# Patient Record
Sex: MALE | Race: WHITE | NOT HISPANIC OR LATINO | Employment: FULL TIME | URBAN - METROPOLITAN AREA
[De-identification: names, ages, dates, MRNs, and addresses within clinical notes are randomized per-mention and may not be internally consistent; named-entity substitution may affect disease eponyms.]

---

## 2017-04-06 ENCOUNTER — TELEPHONE (OUTPATIENT)
Dept: NURSING | Facility: CLINIC | Age: 57
End: 2017-04-06

## 2017-04-06 NOTE — TELEPHONE ENCOUNTER
Call Type: Triage Call    Presenting Problem: Abdirahman was seen yesterday, diagnosed with  influenza. Prescribed Tamiflu. Fevers have been 102.8-103.2 today.  Has taken Advil, last taken about 0900 (9 hours ago).  Triage Note:  Guideline Title: Fever - Adult  Recommended Disposition: Provide Home/Self Care  Original Inclination: Wanted to speak with a nurse  Override Disposition:  Intended Action: Follow Selfcare / Homecare  Physician Contacted: No  Taking a new prescription for antibiotic less than 48 hours AND has new or  worsening fever or recurring fever. ?  YES  Signs of dehydration ? NO  Abdominal pain is present ? NO  Sudden change in mental status ? NO  New seizure now or within last 6 hours ? NO  Temperature of 101.5 F (38.6 C) or greater that has not responded to 24 hours of  home care measures ? NO  Severe breathing problems not related to nasal congestion ? NO  Has urgency, frequency, discolored urine, pain or burning with urination ? NO  Has urgency, frequency, discolored urine, pain or burning with urination ? NO  Fever after starting new medication within past 7 days ? NO  Has received immunization in past 48 hours ? NO  New skin rash associated with the fever ? NO  Recurrence of fever 101.5 F (38.6 C) in individual after more than 24 continuous  hours without any fever ? NO  Fever associated with heat exposure ? NO  Associated with cold or upper respiratory infection symptoms ? NO  Sudden onset of flu-like symptoms ? NO  Fever 104 F (40.0 C) or higher ? NO  Fever occurring intermittently on most days for more than 1 week ? NO  Less than 4 weeks postoperative or post other invasive procedure ? NO  Has completed antibiotic therapy within last 7 days OR is taking new prescription  of antibiotic for 48 hours or more AND has new or worsening fever, recurring  fever, or other new symptoms ? NO  Worsening signs of soft tissue infection ? NO  Any temperature elevation AND new onset of neck pain with forward  head movement  (no injury), severe generalized headache, or altered mental status ? NO  Frail elderly or immunocompromised with an oral temperature greater than 99 F  (37.2) or higher ? NO  Pregnant person with temperature of 100.5 F (38.0 C) or higher ? NO  Recurrence of fever 99 F (37.2 C) or greater in frail elderly after more than 24  continuous hours without any fever ? NO  Recurrence of fever 100 F (37.7 C) or greater in immunocompromised or pregnant  person after more than 24 continuous hours without any fever ? NO  High to low (but not zero) risk of exposure to Ebola within the past 21 days ? NO  Traveled out of country in past 2 months and new onset of unexplained symptom(s) ?  NO  Unexplained blood-colored (purple or red) flat pinpoint dots, spots or patches on  the skin ? NO  Physician Instructions:  Care Advice: During pregnancy, call provider if temperature is 100 F (37.7  C) or greater OR any temperature elevation for 3 days even while taking  acetaminophen.  Follow provider's specified instructions. If instructions not given or are  not able to be followed for whatever reason, call provider.  COMFORT MEASURES FOR A FEVER:   - Drink cool liquids or eat ice chips or  popsicles. Avoid drinks with alcohol or caffeine.   - Wear one layer of  light-weight clothing.   - Consider using a fan to improve circulation.   -  Rest until temperature returns to normal and other symptoms improve.   -  Use a lightweight blanket or other bedding.   - A lukewarm (not cold) bath  or shower can help lower body temperature.  Cold water can cause shivering  and raise temperature. If shivering starts, dry off and cover with  lightweight clothing.

## 2018-11-23 ENCOUNTER — APPOINTMENT (OUTPATIENT)
Dept: CT IMAGING | Facility: CLINIC | Age: 58
End: 2018-11-23
Attending: EMERGENCY MEDICINE
Payer: COMMERCIAL

## 2018-11-23 ENCOUNTER — HOSPITAL ENCOUNTER (EMERGENCY)
Facility: CLINIC | Age: 58
Discharge: HOME | End: 2018-11-23
Attending: EMERGENCY MEDICINE | Admitting: EMERGENCY MEDICINE
Payer: COMMERCIAL

## 2018-11-23 ENCOUNTER — APPOINTMENT (OUTPATIENT)
Dept: ULTRASOUND IMAGING | Facility: CLINIC | Age: 58
End: 2018-11-23
Attending: EMERGENCY MEDICINE
Payer: COMMERCIAL

## 2018-11-23 VITALS
TEMPERATURE: 98 F | HEART RATE: 91 BPM | BODY MASS INDEX: 26.29 KG/M2 | SYSTOLIC BLOOD PRESSURE: 127 MMHG | DIASTOLIC BLOOD PRESSURE: 86 MMHG | WEIGHT: 178 LBS | RESPIRATION RATE: 18 BRPM | OXYGEN SATURATION: 95 %

## 2018-11-23 DIAGNOSIS — I82.812 ACUTE SUPERFICIAL VENOUS THROMBOSIS OF LOWER EXTREMITY, LEFT: ICD-10-CM

## 2018-11-23 LAB
ANION GAP SERPL CALCULATED.3IONS-SCNC: 5 MMOL/L (ref 3–14)
BASOPHILS # BLD AUTO: 0.1 10E9/L (ref 0–0.2)
BASOPHILS NFR BLD AUTO: 1.1 %
BUN SERPL-MCNC: 18 MG/DL (ref 7–30)
CALCIUM SERPL-MCNC: 8.1 MG/DL (ref 8.5–10.1)
CHLORIDE SERPL-SCNC: 110 MMOL/L (ref 94–109)
CO2 SERPL-SCNC: 29 MMOL/L (ref 20–32)
CREAT SERPL-MCNC: 0.9 MG/DL (ref 0.66–1.25)
DIFFERENTIAL METHOD BLD: NORMAL
EOSINOPHIL # BLD AUTO: 0.1 10E9/L (ref 0–0.7)
EOSINOPHIL NFR BLD AUTO: 1.8 %
ERYTHROCYTE [DISTWIDTH] IN BLOOD BY AUTOMATED COUNT: 12.6 % (ref 10–15)
GFR SERPL CREATININE-BSD FRML MDRD: 87 ML/MIN/1.7M2
GLUCOSE SERPL-MCNC: 93 MG/DL (ref 70–99)
HCT VFR BLD AUTO: 43.1 % (ref 40–53)
HGB BLD-MCNC: 14.3 G/DL (ref 13.3–17.7)
IMM GRANULOCYTES # BLD: 0 10E9/L (ref 0–0.4)
IMM GRANULOCYTES NFR BLD: 0.2 %
INTERPRETATION ECG - MUSE: NORMAL
LYMPHOCYTES # BLD AUTO: 2.8 10E9/L (ref 0.8–5.3)
LYMPHOCYTES NFR BLD AUTO: 41.4 %
MCH RBC QN AUTO: 31.2 PG (ref 26.5–33)
MCHC RBC AUTO-ENTMCNC: 33.2 G/DL (ref 31.5–36.5)
MCV RBC AUTO: 94 FL (ref 78–100)
MONOCYTES # BLD AUTO: 0.8 10E9/L (ref 0–1.3)
MONOCYTES NFR BLD AUTO: 11.4 %
NEUTROPHILS # BLD AUTO: 2.9 10E9/L (ref 1.6–8.3)
NEUTROPHILS NFR BLD AUTO: 44.1 %
NRBC # BLD AUTO: 0 10*3/UL
NRBC BLD AUTO-RTO: 0 /100
NT-PROBNP SERPL-MCNC: 190 PG/ML (ref 0–900)
PLATELET # BLD AUTO: 243 10E9/L (ref 150–450)
POTASSIUM SERPL-SCNC: 3.9 MMOL/L (ref 3.4–5.3)
RBC # BLD AUTO: 4.58 10E12/L (ref 4.4–5.9)
SODIUM SERPL-SCNC: 144 MMOL/L (ref 133–144)
TROPONIN I SERPL-MCNC: <0.015 UG/L (ref 0–0.04)
WBC # BLD AUTO: 6.6 10E9/L (ref 4–11)

## 2018-11-23 PROCEDURE — 99285 EMERGENCY DEPT VISIT HI MDM: CPT | Mod: 25

## 2018-11-23 PROCEDURE — 71260 CT THORAX DX C+: CPT

## 2018-11-23 PROCEDURE — 80048 BASIC METABOLIC PNL TOTAL CA: CPT | Performed by: EMERGENCY MEDICINE

## 2018-11-23 PROCEDURE — 84484 ASSAY OF TROPONIN QUANT: CPT | Performed by: EMERGENCY MEDICINE

## 2018-11-23 PROCEDURE — 25000128 H RX IP 250 OP 636: Performed by: EMERGENCY MEDICINE

## 2018-11-23 PROCEDURE — 96360 HYDRATION IV INFUSION INIT: CPT | Mod: 59

## 2018-11-23 PROCEDURE — 83880 ASSAY OF NATRIURETIC PEPTIDE: CPT | Performed by: EMERGENCY MEDICINE

## 2018-11-23 PROCEDURE — 93971 EXTREMITY STUDY: CPT | Mod: LT

## 2018-11-23 PROCEDURE — 96361 HYDRATE IV INFUSION ADD-ON: CPT

## 2018-11-23 PROCEDURE — 85025 COMPLETE CBC W/AUTO DIFF WBC: CPT | Performed by: EMERGENCY MEDICINE

## 2018-11-23 RX ORDER — LIDOCAINE 40 MG/G
CREAM TOPICAL
Status: DISCONTINUED | OUTPATIENT
Start: 2018-11-23 | End: 2018-11-23 | Stop reason: HOSPADM

## 2018-11-23 RX ORDER — IOPAMIDOL 755 MG/ML
500 INJECTION, SOLUTION INTRAVASCULAR ONCE
Status: COMPLETED | OUTPATIENT
Start: 2018-11-23 | End: 2018-11-23

## 2018-11-23 RX ADMIN — SODIUM CHLORIDE 82 ML: 9 INJECTION, SOLUTION INTRAVENOUS at 04:49

## 2018-11-23 RX ADMIN — SODIUM CHLORIDE 1000 ML: 9 INJECTION, SOLUTION INTRAVENOUS at 04:17

## 2018-11-23 RX ADMIN — IOPAMIDOL 67 ML: 755 INJECTION, SOLUTION INTRAVENOUS at 04:40

## 2018-11-23 ASSESSMENT — ENCOUNTER SYMPTOMS
MYALGIAS: 1
SHORTNESS OF BREATH: 1

## 2018-11-23 NOTE — ED AVS SNAPSHOT
[facility] Emergency Department    [address] [place] [address]    Phone:  [phone]    Fax:  [phone]                                       Abdirahman Reno   MRN: [id number]    Department:  [facility] Emergency Department   Date of Visit:  11/23/2018           Patient Information     Date Of Birth          1960        Your diagnoses for this visit were:     Acute Greater Saphenous Vein thrombosis of lower extremity, left        You were seen by Arnoldo Arreguin MD.      Follow-up Information     Follow up with Hu Mar DO.    Specialty:  Family Practice    Why:  This next week to discuss ongoing anticoagulation    Contact information:    [facility]  [address]  [facility] [place] [address] [phone]        Discharge References/Attachments     VENOUS THROMBOEMBOLISM, UNDERSTANDING (ENGLISH)      24 Hour Appointment Hotline       To make an appointment at any [place] clinic, call [phone] ([phone]). If you don't have a family doctor or clinic, we will help you find one. [place] clinics are conveniently located to serve the needs of you and your family.             Review of your medicines      START taking        Dose / Directions Last dose taken    apixaban ANTICOAGULANT 5 MG tablet   Commonly known as:  ELIQUIS STARTER PACK   Quantity:  74 tablet        Take 10mg PO BID x 7 days, then take 5mg PO BID going forward.   Refills:  0          Our records show that you are taking the medicines listed below. If these are incorrect, please call your family doctor or clinic.        Dose / Directions Last dose taken    citalopram 40 MG tablet   Commonly known as:  celeXA   Dose:  20 mg        Take 20 mg by mouth daily . Takes 1/2 tab daily   Refills:  0        omeprazole 20 MG CR capsule   Commonly known as:  priLOSEC   Dose:  20 mg        Take 20 mg by mouth daily.   Refills:  0        traMADol 50 MG tablet    Commonly known as:  ULTRAM   Dose:  50 mg   Quantity:  12 tablet        Take 1 tablet (50 mg) by mouth every 8 hours as needed for pain   Refills:  0                Prescriptions were sent or printed at these locations (1 Prescription)                   Other Prescriptions                Printed at Department/Unit printer (1 of 1)         apixaban ANTICOAGULANT (ELIQUIS STARTER PACK) 5 MG tablet                Procedures and tests performed during your visit     Basic metabolic panel    CBC with platelets differential    CT Chest Pulmonary Embolism w Contrast    EKG 12 lead    Nt probnp inpatient (BNP)    Troponin I    US Lower Extremity Venous Duplex Left      Orders Needing Specimen Collection     None      Pending Results     Date and Time Order Name Status Description    11/23/2018 0358 EKG 12 lead Preliminary             Pending Culture Results     No orders found from 11/21/2018 to 11/24/2018.            Pending Results Instructions     If you had any lab results that were not finalized at the time of your Discharge, you can call the ED Lab Result RN at [phone]. You will be contacted by this team for any positive Lab results or changes in treatment. The nurses are available 7 days a week from 10A to 6:30P.  You can leave a message 24 hours per day and they will return your call.        Test Results From Your Hospital Stay        11/23/2018  4:22 AM      Component Results     Component Value Ref Range & Units Status    WBC 6.6 4.0 - 11.0 10e9/L Final    RBC Count 4.58 4.4 - 5.9 10e12/L Final    Hemoglobin 14.3 13.3 - 17.7 g/dL Final    Hematocrit 43.1 40.0 - 53.0 % Final    MCV 94 78 - 100 fl Final    MCH 31.2 26.5 - 33.0 pg Final    MCHC 33.2 31.5 - 36.5 g/dL Final    RDW 12.6 10.0 - 15.0 % Final    Platelet Count 243 150 - 450 10e9/L Final    Diff Method Automated Method  Final    % Neutrophils 44.1 % Final    % Lymphocytes 41.4 % Final    % Monocytes 11.4 % Final    % Eosinophils 1.8 % Final    %  Basophils 1.1 % Final    % Immature Granulocytes 0.2 % Final    Nucleated RBCs 0 0 /100 Final    Absolute Neutrophil 2.9 1.6 - 8.3 10e9/L Final    Absolute Lymphocytes 2.8 0.8 - 5.3 10e9/L Final    Absolute Monocytes 0.8 0.0 - 1.3 10e9/L Final    Absolute Eosinophils 0.1 0.0 - 0.7 10e9/L Final    Absolute Basophils 0.1 0.0 - 0.2 10e9/L Final    Abs Immature Granulocytes 0.0 0 - 0.4 10e9/L Final    Absolute Nucleated RBC 0.0  Final         11/23/2018  4:42 AM      Component Results     Component Value Ref Range & Units Status    Sodium 144 133 - 144 mmol/L Final    Potassium 3.9 3.4 - 5.3 mmol/L Final    Chloride 110 (H) 94 - 109 mmol/L Final    Carbon Dioxide 29 20 - 32 mmol/L Final    Anion Gap 5 3 - 14 mmol/L Final    Glucose 93 70 - 99 mg/dL Final    Urea Nitrogen 18 7 - 30 mg/dL Final    Creatinine 0.90 0.66 - 1.25 mg/dL Final    GFR Estimate 87 >60 mL/min/1.7m2 Final    Non  GFR Calc    GFR Estimate If Black >90 >60 mL/min/1.7m2 Final    African American GFR Calc    Calcium 8.1 (L) 8.5 - 10.1 mg/dL Final         11/23/2018  4:42 AM      Component Results     Component Value Ref Range & Units Status    N-Terminal Pro BNP Inpatient 190 0 - 900 pg/mL Final       Reference range shown and results flagged as abnormal are suggested inpatient   cut points for confirming diagnosis if CHF in an acute setting. Establishing a   baseline value for each individual patient is useful for follow-up. An   inpatient or emergency department NT-proPBNP <300 pg/mL effectively rules out   acute CHF, with 99% negative predictive value.  The outpatient non-acute reference range for ruling out CHF is:   0-125 pg/mL (age 18 to less than 75)   0-450 pg/mL (age 75 yrs and older)           11/23/2018  4:42 AM      Component Results     Component Value Ref Range & Units Status    Troponin I ES <0.015 0.000 - 0.045 ug/L Final    The 99th percentile for upper reference range is 0.045 ug/L.  Troponin values   in the range of  0.045 - 0.120 ug/L may be associated with risks of adverse   clinical events.           11/23/2018  5:48 AM      Narrative     CT CHEST PULMONARY EMBOLISM W CONTRAST  11/23/2018 4:54 AM     HISTORY: Shortness of breath.    TECHNIQUE: Volumetric acquisition of the chest after the  administration of 67 mL Isovue-370 IV contrast. Radiation dose for  this scan was reduced using automated exposure control, adjustment of  the mA and/or kV according to patient size, or iterative  reconstruction technique.     COMPARISON: 12/12/2014.    FINDINGS: No visualized pulmonary embolism. Minimal hazy density in  the lower lungs, likely atelectasis. No consolidative infiltrates or  pleural effusions. No pneumothorax. A tiny nodule in the right midlung  is stable and considered benign. Coronary artery calcifications.  Normal heart size. No enlarged mediastinal or hilar lymph nodes. Small  esophageal hiatal hernia.        Impression     IMPRESSION: No visualized pulmonary embolism or other acute findings.    DARELL VALDES MD         11/23/2018  5:48 AM      Narrative     US LOWER EXTREMITY VENOUS DUPLEX LEFT  11/23/2018 4:56 AM     HISTORY: Leg pain, history of DVT distantly.    TECHNIQUE: Venous Doppler ultrasound of the lower extremity. Color  flow and spectral Doppler with waveform analysis performed.    COMPARISON: None.    FINDINGS: Ultrasound of the left leg demonstrates no deep vein  thrombus from the common femoral through popliteal veins or in the  visualized segments of posterior tibial or peroneal veins in the calf.  There is superficial vein thrombus involving the greater saphenous  vein from the proximal aspect of the thigh into the proximal aspect of  the calf. This appears acute and is fairly occlusive of this saphenous  vein segment.        Impression     IMPRESSION:   1. No DVT identified left leg.  2. Superficial vein thrombus involving the left greater saphenous  vein.    DARELL VALDES MD                 Clinical Quality Measure: Blood Pressure Screening     Your blood pressure was checked while you were in the emergency department today. The last reading we obtained was  BP: (!) 136/93 . Please read the guidelines below about what these numbers mean and what you should do about them.  If your systolic blood pressure (the top number) is less than 120 and your diastolic blood pressure (the bottom number) is less than 80, then your blood pressure is normal. There is nothing more that you need to do about it.  If your systolic blood pressure (the top number) is 120-139 or your diastolic blood pressure (the bottom number) is 80-89, your blood pressure may be higher than it should be. You should have your blood pressure rechecked within a year by a primary care provider.  If your systolic blood pressure (the top number) is 140 or greater or your diastolic blood pressure (the bottom number) is 90 or greater, you may have high blood pressure. High blood pressure is treatable, but if left untreated over time it can put you at risk for heart attack, stroke, or kidney failure. You should have your blood pressure rechecked by a primary care provider within the next 4 weeks.  If your provider in the emergency department today gave you specific instructions to follow-up with your doctor or provider even sooner than that, you should follow that instruction and not wait for up to 4 weeks for your follow-up visit.        Thank you for choosing [facility]       Thank you for choosing [facility] for your care. Our goal is always to provide you with excellent care. Hearing back from our patients is one way we can continue to improve our services. Please take a few minutes to complete the written survey that you may receive in the mail after you visit with us. Thank you!        [employer] Information     [employer] gives you secure access to your electronic health record. If you see a primary care provider, you can also send messages to your  care team and make appointments. If you have questions, please call your primary care clinic.  If you do not have a primary care provider, please call [phone] and they will assist you.        Care EveryWhere ID     This is your Care EveryWhere ID. This could be used by other organizations to access your [place] medical records  [id number]        Equal Access to Services     WINSTON HUMPHRIES : Monty Norwood, isabelle brambila, dionicio chavarria. So [facility] [phone].    ATENCIÓN: Si habla español, tiene a woodruff disposición servicios gratuitos de asistencia lingüística. Llame al [phone].    We comply with applicable federal civil rights laws and [place] laws. We do not discriminate on the basis of race, color, national origin, age, disability, sex, sexual orientation, or gender identity.            After Visit Summary       This is your record. Keep this with you and show to your community pharmacist(s) and doctor(s) at your next visit.

## 2018-11-23 NOTE — ED PROVIDER NOTES
History     Chief Complaint:  Leg pain     HPI   Abdirahman Reno is a 58 year old male with a history of DVT and PE who presents for evaluation of left leg pain. The patient reports that his left leg has been hurting for the past three days and believes that it is due to a blood clot as he has had similar pain in the past which was due to a DVT. In addition to left upper calf pain, the patient also has been feeling short of breath and sharp chest pain. Patient's last DVT was several years ago. Of note, patient used to take Warfarin but his primary physician discontinued his prescription.       Allergies:  No known drug allergies     Medications:    Celexa   Prilosec     Past Medical History:    H/o Bilateral pulmonary embolism  Acid reflux  Depressive disorder  H/o DVT    Past Surgical History:    Back surgery   Orthopedic surgery - hip surgery x3  Release carpal tunnel      Family History:    History reviewed. No pertinent family history.     Social History:  Smoking status: Former smoker  Alcohol use: Yes    Marital Status:       Review of Systems   Respiratory: Positive for shortness of breath.    Cardiovascular: Positive for chest pain.   Musculoskeletal: Positive for myalgias.   All other systems reviewed and are negative.        Physical Exam     Patient Vitals for the past 24 hrs:   BP Temp Heart Rate Resp SpO2 Weight   11/23/18 0545 136/93 - - - - -   11/23/18 0530 129/80 - - - - -   11/23/18 0515 130/88 - - - - -   11/23/18 0415 136/93 - - - 95 % -   11/23/18 0356 133/99 98  F (36.7  C) 91 18 98 % 80.7 kg (178 lb)         Physical Exam  Nursing note and vitals reviewed.  Constitutional: Cooperative.   HENT:   Mouth/Throat: Mucous membranes are normal.    Cardiovascular: Normal rate, regular rhythm and normal heart sounds.  No murmur.  Pulmonary/Chest: Effort normal and breath sounds normal. No respiratory distress. No wheezes. No rales.   Abdominal: Soft. Normal appearance and bowel sounds  are normal. No distension. There is no tenderness. There is no rigidity and no guarding.   Musculoskeletal: Normal range of motion of LLE. No edema in left leg, Tenderness to medial upper calf on the left as well as medial thigh.   Neurological: Alert. Oriented x4  Skin: Skin is warm and dry. No rash noted.   Psychiatric: Normal mood and affect.     Emergency Department Course   ECG (04:-1:17):  Rate 80 bpm. MD interval 118. QRS duration 84. QT/QTc 368/424. P-R-T axes 57 4 46. Normal sinus rhythm, Normal ECG,  Interpreted at 0404 by Arnoldo Arreguin MD.    Imaging:  Radiographic findings were communicated with the patient who voiced understanding of the findings.    US lower extremity venous duplex left  IMPRESSION:   1. No DVT identified left leg.  2. Superficial vein thrombus involving the left greater saphenous vein.  As read by radiology     CT chest pulmonary embolism w contrast   IMPRESSION: No visualized pulmonary embolism or other acute findings.  As read by radiology     Laboratory:  CBC: WNL (WBC 6.6, HGB 14.3, )  BMP:Chloride 110 (H), Calcium 8.1 (L),  WNL (Creatinine 0.90)    BNP: 190  Troponin I: <0.015    Interventions:  0417 NaCl BOLUS 2000 ml IV  1 unit packed red blood cells      Emergency Department Course:  Past medical records, nursing notes, and vitals reviewed.  0406: I performed an exam of the patient and obtained history, as documented above.    IV inserted and blood drawn.    The patient was sent for a US lower extremity venous duplex left and CT chest pulmonary embolism while in the emergency department, findings above.    0536: I rechecked the patient. Explained findings to the patient.    0621: I spoke on the phone with Hematology, who recommended starting the patient on an anticoagulant.     0637: I rechecked the patient. Findings and plan explained to the Patient. Patient discharged home with instructions regarding supportive care, medications, and reasons to return. The importance  of close follow-up was reviewed.     Impression & Plan      Medical Decision Making:  Mickey is a 58 year old gentleman with a history of DVT, PE who resents with pain in his left medial leg. Unfortunately he has a large thrombus in his greater saphenous vein. This is very proximal in the thigh close to the saphenofemoral junction. After discussion with Hematology we will start anticoagulation with Eliquis for minimum of 3-6 months to follow up with his regular physician for ongoing care. No evidence of pulmonary embolism or indication for hospitalization at this time.       Diagnosis:    ICD-10-CM    1. Acute Greater Saphenous Vein thrombosis of lower extremity, left I82.812        Disposition:  discharged to home    Discharge Medications:  New Prescriptions    APIXABAN ANTICOAGULANT (ELIQUIS STARTER PACK) 5 MG TABLET    Take 10mg PO BID x 7 days, then take 5mg PO BID going forward.         Negro Stringer  11/23/2018   [facility] EMERGENCY DEPARTMENT    Scribe Disclosure:  INegro, am serving as a scribe at 4:06 AM on 11/23/2018 to document services personally performed by Arnoldo Arreguin MD based on my observations and the provider's statements to me.        Arnoldo Arreguin MD  11/23/18 0654

## 2018-11-23 NOTE — ED AVS SNAPSHOT
[facility] Emergency Department    [address]    [facility] [address]    Phone:  [phone]    Fax:  [phone]                                       Abdirahman Reno   MRN: [id number]    Department:  [facility] Emergency Department   Date of Visit:  11/23/2018           After Visit Summary Signature Page     I have received my discharge instructions, and my questions have been answered. I have discussed any challenges I see with this plan with the nurse or doctor.    ..........................................................................................................................................  Patient/Patient Representative Signature      ..........................................................................................................................................  Patient Representative Print Name and Relationship to Patient    ..................................................               ................................................  Date                                   Time    ..........................................................................................................................................  Reviewed by Signature/Title    ...................................................              ..............................................  Date                                               Time          22EPIC Rev 08/18

## 2018-11-25 ENCOUNTER — NURSE TRIAGE (OUTPATIENT)
Dept: NURSING | Facility: CLINIC | Age: 58
End: 2018-11-25

## 2018-11-25 NOTE — TELEPHONE ENCOUNTER
"    Reason for Disposition    Patient sounds very sick or weak to the triager    Additional Information    Negative: Looks like a broken bone or dislocated joint (e.g., crooked or deformed)    Negative: Sounds like a life-threatening emergency to the triager    Negative: Followed a leg injury    Negative: Leg swelling is main symptom    Negative: Back pain radiating (shooting) into leg(s)    Negative: Knee pain is main symptom    Negative: Ankle pain is main symptom    Negative: Pregnant    Negative: Chest pain    Negative: Difficulty breathing    Negative: Entire foot is cool or blue in comparison to other side    Negative: Unable to walk    Negative: [1] Cast on leg or ankle AND [2] now increased pain    Negative: [1] Swollen joint AND [2] fever    Negative: [1] Red area or streak AND [2] fever    Answer Assessment - Initial Assessment Questions  1. ONSET: \"When did the pain start?\"       6 days, seen in ER 11/23  2. LOCATION: \"Where is the pain located?\"       Left inner leg  3. PAIN: \"How bad is the pain?\"    (Scale 1-10; or mild, moderate, severe)    -  MILD (1-3): doesn't interfere with normal activities     -  MODERATE (4-7): interferes with normal activities (e.g., work or school) or awakens from sleep, limping     -  SEVERE (8-10): excruciating pain, unable to do any normal activities, unable to walk      moderate  4. WORK OR EXERCISE: \"Has there been any recent work or exercise that involved this part of the body?\"       no  5. CAUSE: \"What do you think is causing the leg pain?\"      Superficial thrombus  6. OTHER SYMPTOMS: \"Do you have any other symptoms?\" (e.g., chest pain, back pain, breathing difficulty, swelling, rash, fever, numbness, weakness)      denies  7. PREGNANCY: \"Is there any chance you are pregnant?\" \"When was your last menstrual period?\"      no    Protocols used: LEG PAIN-ADULT-AH      "

## 2018-11-26 ENCOUNTER — HOSPITAL ENCOUNTER (EMERGENCY)
Facility: CLINIC | Age: 58
Discharge: HOME | End: 2018-11-26
Attending: EMERGENCY MEDICINE | Admitting: EMERGENCY MEDICINE
Payer: COMMERCIAL

## 2018-11-26 ENCOUNTER — APPOINTMENT (OUTPATIENT)
Dept: CT IMAGING | Facility: CLINIC | Age: 58
End: 2018-11-26
Attending: EMERGENCY MEDICINE
Payer: COMMERCIAL

## 2018-11-26 VITALS
HEART RATE: 94 BPM | RESPIRATION RATE: 18 BRPM | OXYGEN SATURATION: 96 % | DIASTOLIC BLOOD PRESSURE: 95 MMHG | TEMPERATURE: 97.6 F | SYSTOLIC BLOOD PRESSURE: 132 MMHG

## 2018-11-26 DIAGNOSIS — M79.604 PAIN OF RIGHT LOWER EXTREMITY: ICD-10-CM

## 2018-11-26 DIAGNOSIS — I82.4Y2 ACUTE DEEP VEIN THROMBOSIS (DVT) OF PROXIMAL VEIN OF LEFT LOWER EXTREMITY (H): ICD-10-CM

## 2018-11-26 LAB
ANION GAP SERPL CALCULATED.3IONS-SCNC: 5 MMOL/L (ref 3–14)
BASOPHILS # BLD AUTO: 0 10E9/L (ref 0–0.2)
BASOPHILS NFR BLD AUTO: 0.6 %
BUN SERPL-MCNC: 17 MG/DL (ref 7–30)
CALCIUM SERPL-MCNC: 8.7 MG/DL (ref 8.5–10.1)
CHLORIDE SERPL-SCNC: 107 MMOL/L (ref 94–109)
CO2 SERPL-SCNC: 28 MMOL/L (ref 20–32)
CREAT SERPL-MCNC: 0.89 MG/DL (ref 0.66–1.25)
DIFFERENTIAL METHOD BLD: NORMAL
EOSINOPHIL # BLD AUTO: 0.1 10E9/L (ref 0–0.7)
EOSINOPHIL NFR BLD AUTO: 1.2 %
ERYTHROCYTE [DISTWIDTH] IN BLOOD BY AUTOMATED COUNT: 12.1 % (ref 10–15)
GFR SERPL CREATININE-BSD FRML MDRD: 88 ML/MIN/1.7M2
GLUCOSE SERPL-MCNC: 87 MG/DL (ref 70–99)
HCT VFR BLD AUTO: 43.7 % (ref 40–53)
HGB BLD-MCNC: 15.1 G/DL (ref 13.3–17.7)
IMM GRANULOCYTES # BLD: 0 10E9/L (ref 0–0.4)
IMM GRANULOCYTES NFR BLD: 0.3 %
LYMPHOCYTES # BLD AUTO: 2.6 10E9/L (ref 0.8–5.3)
LYMPHOCYTES NFR BLD AUTO: 39.2 %
MCH RBC QN AUTO: 31.1 PG (ref 26.5–33)
MCHC RBC AUTO-ENTMCNC: 34.6 G/DL (ref 31.5–36.5)
MCV RBC AUTO: 90 FL (ref 78–100)
MONOCYTES # BLD AUTO: 0.5 10E9/L (ref 0–1.3)
MONOCYTES NFR BLD AUTO: 6.8 %
NEUTROPHILS # BLD AUTO: 3.4 10E9/L (ref 1.6–8.3)
NEUTROPHILS NFR BLD AUTO: 51.9 %
NRBC # BLD AUTO: 0 10*3/UL
NRBC BLD AUTO-RTO: 0 /100
NT-PROBNP SERPL-MCNC: 136 PG/ML (ref 0–900)
PLATELET # BLD AUTO: 257 10E9/L (ref 150–450)
POTASSIUM SERPL-SCNC: 3.7 MMOL/L (ref 3.4–5.3)
RBC # BLD AUTO: 4.85 10E12/L (ref 4.4–5.9)
SODIUM SERPL-SCNC: 140 MMOL/L (ref 133–144)
TROPONIN I SERPL-MCNC: <0.015 UG/L (ref 0–0.04)
WBC # BLD AUTO: 6.6 10E9/L (ref 4–11)

## 2018-11-26 PROCEDURE — 85025 COMPLETE CBC W/AUTO DIFF WBC: CPT | Performed by: EMERGENCY MEDICINE

## 2018-11-26 PROCEDURE — 96374 THER/PROPH/DIAG INJ IV PUSH: CPT | Mod: 59

## 2018-11-26 PROCEDURE — 83880 ASSAY OF NATRIURETIC PEPTIDE: CPT | Performed by: EMERGENCY MEDICINE

## 2018-11-26 PROCEDURE — 80048 BASIC METABOLIC PNL TOTAL CA: CPT | Performed by: EMERGENCY MEDICINE

## 2018-11-26 PROCEDURE — 96375 TX/PRO/DX INJ NEW DRUG ADDON: CPT

## 2018-11-26 PROCEDURE — 25000128 H RX IP 250 OP 636: Performed by: EMERGENCY MEDICINE

## 2018-11-26 PROCEDURE — 99285 EMERGENCY DEPT VISIT HI MDM: CPT | Mod: 25

## 2018-11-26 PROCEDURE — 96361 HYDRATE IV INFUSION ADD-ON: CPT

## 2018-11-26 PROCEDURE — 71260 CT THORAX DX C+: CPT

## 2018-11-26 PROCEDURE — 93005 ELECTROCARDIOGRAM TRACING: CPT

## 2018-11-26 PROCEDURE — 84484 ASSAY OF TROPONIN QUANT: CPT | Performed by: EMERGENCY MEDICINE

## 2018-11-26 RX ORDER — HYDROMORPHONE HYDROCHLORIDE 1 MG/ML
0.5 INJECTION, SOLUTION INTRAMUSCULAR; INTRAVENOUS; SUBCUTANEOUS
Status: DISCONTINUED | OUTPATIENT
Start: 2018-11-26 | End: 2018-11-26 | Stop reason: HOSPADM

## 2018-11-26 RX ORDER — IOPAMIDOL 755 MG/ML
500 INJECTION, SOLUTION INTRAVASCULAR ONCE
Status: COMPLETED | OUTPATIENT
Start: 2018-11-26 | End: 2018-11-26

## 2018-11-26 RX ORDER — ONDANSETRON 2 MG/ML
4 INJECTION INTRAMUSCULAR; INTRAVENOUS EVERY 30 MIN PRN
Status: DISCONTINUED | OUTPATIENT
Start: 2018-11-26 | End: 2018-11-26 | Stop reason: HOSPADM

## 2018-11-26 RX ORDER — SODIUM CHLORIDE 9 MG/ML
1000 INJECTION, SOLUTION INTRAVENOUS CONTINUOUS
Status: DISCONTINUED | OUTPATIENT
Start: 2018-11-26 | End: 2018-11-26 | Stop reason: HOSPADM

## 2018-11-26 RX ADMIN — SODIUM CHLORIDE 92 ML: 9 INJECTION, SOLUTION INTRAVENOUS at 17:40

## 2018-11-26 RX ADMIN — HYDROMORPHONE HYDROCHLORIDE 0.5 MG: 1 INJECTION, SOLUTION INTRAMUSCULAR; INTRAVENOUS; SUBCUTANEOUS at 16:27

## 2018-11-26 RX ADMIN — ONDANSETRON 4 MG: 2 INJECTION INTRAMUSCULAR; INTRAVENOUS at 16:27

## 2018-11-26 RX ADMIN — SODIUM CHLORIDE 1000 ML: 9 INJECTION, SOLUTION INTRAVENOUS at 16:27

## 2018-11-26 RX ADMIN — IOPAMIDOL 73 ML: 755 INJECTION, SOLUTION INTRAVENOUS at 17:40

## 2018-11-26 ASSESSMENT — ENCOUNTER SYMPTOMS
NUMBNESS: 1
SHORTNESS OF BREATH: 1
FEVER: 0
ABDOMINAL PAIN: 0
NERVOUS/ANXIOUS: 1
BACK PAIN: 0

## 2018-11-26 NOTE — LETTER
November 26, 2018      To Whom It May Concern:      Abdirahman Smith Rowdy was seen in our Emergency Department today, 11/26/18.  I expect his condition to improve over the next 1-2 days.  He may return to work/school when improved.    Sincerely,        Robert Kinney MD

## 2018-11-26 NOTE — ED PROVIDER NOTES
"  History     Chief Complaint:  Bilateral Leg Pain & Swelling; Shortness of Breath    The history is provided by the patient.      Abdirahman Reno is a 58 year old male with a history of recurrent DVT's who presents for evaluation of bilateral leg swelling and pain. The patient reports that he sought evaluation 3 days ago, was diagnosed with left leg DVT, and was subsequently placed on Eliquis. This DVT was characterized by left leg pain along his interior calf; he notes that this was palpable and has since spread to include his interior mid thigh. ED Course note from this visit is detailed below.    Today, the patient states that this morning he experienced onset of intense, diffuse right leg pain posteriorly. He notes that he feels as if his \"leg is going to burst\" and that the pain in his right leg is now much more intense than that of his left leg. Also endorses a numbness from his right knee down, as if his \"leg is falling asleep.\" While on the phone with an emergency department nurse this morning at 7:30 AM, the patient also states that he very suddenly felt as if his heart was racing; he notes that this may be due to panic. Patient also notes that he has been feeling somewhat short of breath in the last couple of days. On presentation, the patient notes that he is not short of breath but does feel anxious.    The patient has a past history of Lovenox use. Patient's last DVT was several years ago and states that this would be his 4th or 5th DVT in total. Of note, patient used to take Warfarin but his primary physician discontinued his prescription. Patient denies back pain, abdominal pain, fever, or other complaint. Patient states that he does have a ride home.    ED Course Note (11/23/2018):  ECG (04:11:17):  Rate 80 bpm. ME interval 118. QRS duration 84. QT/QTc 368/424. P-R-T axes 57 4 46. Normal sinus rhythm, Normal ECG,  Interpreted at 0404 by Arnoldo Arreguin MD.     Imaging:  US lower extremity venous " duplex left  IMPRESSION:   1. No DVT identified left leg.  2. Superficial vein thrombus involving the left greater saphenous vein.     CT chest pulmonary embolism w contrast   IMPRESSION: No visualized pulmonary embolism or other acute findings.     Laboratory:  CBC: WNL (WBC 6.6, HGB 14.3, )  BMP:Chloride 110 (H), Calcium 8.1 (L),  WNL (Creatinine 0.90)  BNP: 190  Troponin I: <0.015    Allergies:  No known drug allergies      Medications:    Eliquis  Celexa  Prilosec  Ultram    Past Medical History:    GERD  Depression  DVT  Headaches  Bilateral PE    Past Surgical History:    Back  Orthopedic   Release carpal tunnel    Family History:    History reviewed. No pertinent family history.      Social History:  Presents alone   Tobacco use: Former smoker (for 30 years)  Alcohol use: Yes  PCP: [facility]    Marital Status:        Review of Systems   Constitutional: Negative for fever.   Respiratory: Positive for shortness of breath (resolved).    Cardiovascular: Positive for leg swelling.   Gastrointestinal: Negative for abdominal pain.   Musculoskeletal: Negative for back pain.   Neurological: Positive for numbness.   Psychiatric/Behavioral: The patient is nervous/anxious.    All other systems reviewed and are negative.    Physical Exam     Patient Vitals for the past 24 hrs:   BP Temp Temp src Pulse Resp SpO2   11/26/18 1918 - - - - - 93 %   11/26/18 1916 - - - - - 93 %   11/26/18 1914 141/89 - - - - 96 %   11/26/18 1730 137/86 - - - - 95 %   11/26/18 1715 (!) 135/92 - - - - 94 %   11/26/18 1700 (!) 134/93 - - - - 94 %   11/26/18 1645 139/86 - - - - 95 %   11/26/18 1630 - - - - - 97 %   11/26/18 1615 (!) 125/95 - - - - 96 %   11/26/18 1600 - - - - - 96 %   11/26/18 1550 (!) 141/93 - - - - -   11/26/18 1447 (!) 142/102 97.6  F (36.4  C) Oral 94 18 98 %        Physical Exam  Constitutional:  Appears well-developed and well-nourished. Alert. Conversant. Non toxic.  HENT:   Head: Atraumatic.   Nose:  Nose normal.  Mouth/Throat: Oral mucosa is clear and moist. no trismus. Pharynx normal. Tonsils symmetric. No tonsillar enlargement, erythema, or exudate.  Eyes: Conjunctivae normal. EOM normal. Pupils equal, round, and reactive to light. No scleral icterus.   Neck: Normal range of motion. Neck supple. No tracheal deviation present.   No JVD  Cardiovascular: Normal rate, regular rhythm. No gallop. No friction rub. No murmur heard. Symmetric radial artery pulses   Pulmonary/Chest: Effort normal. No stridor. No respiratory distress. No wheezes. No rales. No rhonchi . No tenderness.   Abdominal: Soft. Bowel sounds normal. No distension. No mass. No tenderness. No rebound. No guarding.   Musculoskeletal:   RUE: Normal range of motion. No tenderness. No deformity  LUE: Normal range of motion. No tenderness. No deformity  RLE: Normal range of motion.  Trace edema.  No deformity.  Patient has pain located deep in his thigh but no obvious point tenderness, rash, ecchymosis  LLE: Normal range of motion.  Trace edema.  Mild tenderness along the distal one third of the medial thigh, medial knee, and proximal/medial calf tenderness. No deformity  Lymph: No cervical adenopathy.   Neurological: Alert and oriented to person, place, and time. Normal strength. CN II-VII intact. No sensory deficit. GCS eye subscore is 4. GCS verbal subscore is 5. GCS motor subscore is 6. Normal coordination   Skin: Skin is warm and dry. No rash noted. No pallor. Normal capillary refill.  Psychiatric:  Normal mood.  Mildly anxious but polite    Emergency Department Course   ECG (16:21:09):  Rate 75 bpm. NM interval 126. QRS duration 84. QT/QTc 394/439. P-R-T axes 49 44 34. Normal sinus rhythm. Normal ECG. Agree with computer interpretation. No significant change when compared to EKG dated 11/23/2018.  Interpreted at 1632 by Robert Kinney MD.     Imaging:  Radiographic findings were communicated with the patient and family who voiced  understanding of the findings.    CT Chest PE with contrast:  IMPRESSION:   1. No evidence for pulmonary embolism or thoracic aortic dissection.  2. Mild emphysematous changes in both upper lungs.  3. Mild to moderate prostatic enlargement.    Imaging independently reviewed and agree with radiologist interpretation.     Laboratory:  CBC: AWNL (WBC 6.6, HGB 15.1, )   BMP: AWNL (Creatinine 0.89)   Nt probnp inpatient (BNP): 136   1708: Troponin: <0.015       Interventions:  1627: NS 1L IV Bolus     1627: Zofran 4 mg IV  1627: Dilaudid 0.5 mg IV  The patient's symptoms were improved with parenteral narcotics.    Emergency Department Course:  Past medical records, nursing notes, and vitals reviewed.  1615: I performed an exam of the patient and obtained history, as documented above.     IV inserted and blood drawn. Above interventions provided. Blood was sent to the lab for further testing, results above.    The patient was sent for a CT while in the emergency department, findings above.    1940: I rechecked the patient. Explained findings to the patient and spouse.     2026: I discussed the patient with pharmacy.    2025: I rechecked the patient. Findings and plan explained to the Patient. Patient discharged home with instructions regarding supportive care, medications, and reasons to return. The importance of close follow-up was reviewed.      Impression & Plan      Medical Decision Making:  Abdirahman Reno is a 58 year old male with a history of prior VT E, diagnosed with a left lower extremity DVT here in the ER 3 days ago who presented to the ER today with progressive left leg pain, new right leg pain, and also shortness of breath.  He was discharged home with a course of Eliquis and has been taking his twice daily dose as prescribed.  However he is felt slow proximal extension of the pain in his left leg for the past couple of days and when he woke up this morning also had pain in his right thigh and  shortness of breath.    Initial concern was for progression of his left leg DVT, possibly to involve clot in the iliacs or vena cava as well as potential embolization to the lungs.  CT scan of his chest was fortunately negative for PE.  EKG is nonischemic and cardiac biomarkers are reassuring.  I do not think this represents an atypical angina.  No evidence for cardiac strain from massive PE.    After discussion with the radiologist to determine the proper test to order, we obtained CT scan of his abdomen pelvis with runoff to the legs to check for progression of his DVT or iliac clot that would not be visualized on a leg ultrasound.  Fortunately the CT scan is normal.     I suspect that his left leg pain is likely due to ongoing DVT there.  We discussed that this will probably resolve with time.  No evidence for progressive infection, ischemia, or phlegmasia cerulea dolens.    Patient thinks that his dyspnea might have been anxiety over his overall symptoms.  No other evidence for life-threatening this time.    Unclear why the patient's right leg is hurting.  With no associated back pain, no dermatomal addition to the pain, no associated weakness, I do not think he needs an MRI of his back, even though he is now on Eliquis which puts him at risk for epidural hematoma.    Most pressingly, the patient says that he is running out of his Eliquis.  He was given a prescription for a one-month supply but because of financial reasons could not get the full month filled.  He was able to get a total of 8 doses from [facility] but does not have money to get any more.  With multiple conversations with our ED pharmacist and with the main hospital pharmacist, we were able to make arrangements to get the patient's month supply of Eliquis filled here at [facility] using the voucher program from the .  Patient will go straight to the pharmacy here to get his prescription filled to make sure he has his necessary  medications.    Precautions for return to the ER were reviewed.  We recommended close outpatient follow-up with his doctor and with heme on to evaluate for possible hypercoagulable syndrome given multiple prior episodes of clotting.    Diagnosis:    ICD-10-CM   1. Acute deep vein thrombosis (DVT) of proximal vein of left lower extremity (H) I82.4Y2   2. Pain of right lower extremity M79.604       Disposition:  Discharged to home with plan as outlined.    Scribe Disclosure:  Jay UMAÑA, am serving as a scribe at 3:51 PM on 11/26/2018 to document services personally performed by Robert Kinney MD based on my observations and the provider's statements to me.   11/26/2018   [facility] EMERGENCY DEPARTMENT     Robert Kinney MD  11/27/18 0009

## 2018-11-26 NOTE — ED AVS SNAPSHOT
[facility] Emergency Department    [address]    [facility] [address]    Phone:  [phone]    Fax:  [phone]                                       Abdirahman Reno   MRN: [id number]    Department:  [facility] Emergency Department   Date of Visit:  11/26/2018           After Visit Summary Signature Page     I have received my discharge instructions, and my questions have been answered. I have discussed any challenges I see with this plan with the nurse or doctor.    ..........................................................................................................................................  Patient/Patient Representative Signature      ..........................................................................................................................................  Patient Representative Print Name and Relationship to Patient    ..................................................               ................................................  Date                                   Time    ..........................................................................................................................................  Reviewed by Signature/Title    ...................................................              ..............................................  Date                                               Time          22EPIC Rev 08/18

## 2018-11-26 NOTE — ED TRIAGE NOTES
Presents to the ED with leg pain and SOB. Seen in clinic on Friday and diagnosed with DVT in left leg. Started on eliquis. States that could feel the palpable mass of clot in his leg spreading up towards groin over the weekend. Today awoke with significant right leg pain and feeling SOB.

## 2018-11-26 NOTE — ED AVS SNAPSHOT
[facility] Emergency Department    [address]    [facility] [address]    Phone:  [phone]    Fax:  [phone]                                       Abdirahman Reno   MRN: [id number]    Department:  [facility] Emergency Department   Date of Visit:  11/26/2018           Patient Information     Date Of Birth          1960        Your diagnoses for this visit were:     Acute deep vein thrombosis (DVT) of proximal vein of left lower extremity (H)     Pain of right lower extremity        You were seen by Robert Kinney MD.      Follow-up Information     Follow up with Hu Mar DO In 3 days.    Specialty:  Family Practice    Contact information:    [facility]  [address] [address] [phone]          Follow up with [facility].    Why:  With 1 of the doctors at the [place] office to recheck to evaluate why you have had so many blood clots per    Contact information:    [address]  [address]  [facility] [phone]        Discharge Instructions       At this time there is no sign of a blood clot in your lungs.  We know that you have a blood clot in your left leg but needs to be treated with blood thinning medications.  Please continue on the Xarelto.     Please fill your prescription for Xarelto at the pharmacy here at [facility].  They will be able to use the voucher program to get you your first month supply of medication.    If you have any worsening pain in her left leg, if you develop any back pain, trouble with urination, trouble with bowel movements, please return to the ER right away for reevaluation.    Please recheck with your regular doctor within 2-3 days and with a hematologist within 1 week.    Discharge References/Attachments     DEEP VEIN THROMBOSIS (DVT) (ENGLISH)    BACK PAIN (LOW) OR LEG PAIN: POSSIBLE CAUSES (ENGLISH)      24 Hour  Appointment Hotline       To make an appointment at any [facility], call [phone] ([phone]). If you don't have a family doctor or clinic, we will help you find one. [facility] are conveniently located to serve the needs of you and your family.             Review of your medicines      CONTINUE these medicines which may have CHANGED, or have new prescriptions. If we are uncertain of the size of tablets/capsules you have at home, strength may be listed as something that might have changed.        Dose / Directions Last dose taken    * apixaban ANTICOAGULANT 5 MG tablet   Commonly known as:  ELIQUIS STARTER PACK   What changed:  Another medication with the same name was added. Make sure you understand how and when to take each.   Quantity:  74 tablet        Take 10mg PO BID x 7 days, then take 5mg PO BID going forward.   Refills:  0        * apixaban ANTICOAGULANT 5 MG tablet   Commonly known as:  ELIQUIS STARTER PACK   Dose:  5 mg   What changed:  You were already taking a medication with the same name, and this prescription was added. Make sure you understand how and when to take each.   Quantity:  58 tablet        Take 1 tablet (5 mg) by mouth 2 times daily 10mg PO BID for three more days, then 5mg PO BID  (already filled the first 4 days of the starter dose, so this prescription will continue his first one month supply)   Refills:  0        * Notice:  This list has 2 medication(s) that are the same as other medications prescribed for you. Read the directions carefully, and ask your doctor or other care provider to review them with you.      Our records show that you are taking the medicines listed below. If these are incorrect, please call your family doctor or clinic.        Dose / Directions Last dose taken    citalopram 40 MG tablet   Commonly known as:  celeXA   Dose:  20 mg        Take 20 mg by mouth daily . Takes 1/2 tab daily   Refills:  0        omeprazole 20 MG DR capsule   Commonly  known as:  priLOSEC   Dose:  20 mg        Take 20 mg by mouth daily.   Refills:  0        traMADol 50 MG tablet   Commonly known as:  ULTRAM   Dose:  50 mg   Quantity:  12 tablet        Take 1 tablet (50 mg) by mouth every 8 hours as needed for pain   Refills:  0                Prescriptions were sent or printed at these locations (1 Prescription)                   Other Prescriptions                Printed at Department/Unit printer (1 of 1)         apixaban ANTICOAGULANT (ELIQUIS STARTER PACK) 5 MG tablet                Procedures and tests performed during your visit     Basic metabolic panel    CBC with platelets differential    CT Chest (PE) Abdomen Pelvis w Contrast    EKG 12-lead, tracing only    Give 20 ounces of water 15 minutes before CT of abdomen    Nt probnp inpatient    Peripheral IV catheter    Troponin I      Orders Needing Specimen Collection     None      Pending Results     Date and Time Order Name Status Description    11/26/2018 1638 CT Chest (PE) Abdomen Pelvis w Contrast Preliminary     11/26/2018 1610 EKG 12-lead, tracing only Preliminary             Pending Culture Results     No orders found from 11/24/2018 to 11/27/2018.            Pending Results Instructions     If you had any lab results that were not finalized at the time of your Discharge, you can call the ED Lab Result RN at [phone]. You will be contacted by this team for any positive Lab results or changes in treatment. The nurses are available 7 days a week from 10A to 6:30P.  You can leave a message 24 hours per day and they will return your call.        Test Results From Your Hospital Stay        11/26/2018  3:47 PM      Component Results     Component Value Ref Range & Units Status    WBC 6.6 4.0 - 11.0 10e9/L Final    RBC Count 4.85 4.4 - 5.9 10e12/L Final    Hemoglobin 15.1 13.3 - 17.7 g/dL Final    Hematocrit 43.7 40.0 - 53.0 % Final    MCV 90 78 - 100 fl Final    MCH 31.1 26.5 - 33.0 pg Final    MCHC 34.6 31.5 - 36.5  g/dL Final    RDW 12.1 10.0 - 15.0 % Final    Platelet Count 257 150 - 450 10e9/L Final    Diff Method Automated Method  Final    % Neutrophils 51.9 % Final    % Lymphocytes 39.2 % Final    % Monocytes 6.8 % Final    % Eosinophils 1.2 % Final    % Basophils 0.6 % Final    % Immature Granulocytes 0.3 % Final    Nucleated RBCs 0 0 /100 Final    Absolute Neutrophil 3.4 1.6 - 8.3 10e9/L Final    Absolute Lymphocytes 2.6 0.8 - 5.3 10e9/L Final    Absolute Monocytes 0.5 0.0 - 1.3 10e9/L Final    Absolute Eosinophils 0.1 0.0 - 0.7 10e9/L Final    Absolute Basophils 0.0 0.0 - 0.2 10e9/L Final    Abs Immature Granulocytes 0.0 0 - 0.4 10e9/L Final    Absolute Nucleated RBC 0.0  Final         11/26/2018  4:02 PM      Component Results     Component Value Ref Range & Units Status    Sodium 140 133 - 144 mmol/L Final    Potassium 3.7 3.4 - 5.3 mmol/L Final    Chloride 107 94 - 109 mmol/L Final    Carbon Dioxide 28 20 - 32 mmol/L Final    Anion Gap 5 3 - 14 mmol/L Final    Glucose 87 70 - 99 mg/dL Final    Urea Nitrogen 17 7 - 30 mg/dL Final    Creatinine 0.89 0.66 - 1.25 mg/dL Final    GFR Estimate 88 >60 mL/min/1.7m2 Final    Non  GFR Calc    GFR Estimate If Black >90 >60 mL/min/1.7m2 Final    African American GFR Calc    Calcium 8.7 8.5 - 10.1 mg/dL Final         11/26/2018  5:08 PM      Component Results     Component Value Ref Range & Units Status    N-Terminal Pro BNP Inpatient 136 0 - 900 pg/mL Final       Reference range shown and results flagged as abnormal are suggested inpatient   cut points for confirming diagnosis if CHF in an acute setting. Establishing a   baseline value for each individual patient is useful for follow-up. An   inpatient or emergency department NT-proPBNP <300 pg/mL effectively rules out   acute CHF, with 99% negative predictive value.  The outpatient non-acute reference range for ruling out CHF is:   0-125 pg/mL (age 18 to less than 75)   0-450 pg/mL (age 75 yrs and older)            11/26/2018  5:08 PM      Component Results     Component Value Ref Range & Units Status    Troponin I ES <0.015 0.000 - 0.045 ug/L Final    The 99th percentile for upper reference range is 0.045 ug/L.  Troponin values   in the range of 0.045 - 0.120 ug/L may be associated with risks of adverse   clinical events.           11/26/2018  6:30 PM      Narrative     CT CHEST PULMONARY EMBOLISM ABDOMEN AND PELVIS WITH CONTRAST    11/26/2018 6:06 PM     HISTORY: Deep venous thrombosis.    TECHNIQUE: 72mL Isovue-370 IV were administered. After contrast  administration, volumetric helical sections were acquired from the  thoracic inlet to the ischial tuberosities. Pulmonary embolism  protocol was performed through the chest. Coronal images were also  reconstructed. Radiation dose for this scan was reduced using  automated exposure control, adjustment of the mA and/or kV according  to patient size, or iterative reconstruction technique.    COMPARISON: Chest CT performed 11/23/2018. CT of the abdomen and  pelvis performed 7/30/2016.    FINDINGS:    Chest: No evidence for pulmonary embolism. The thoracic aorta is of  normal caliber, without evidence for aneurysm or dissection. Coronary  artery calcifications. No pleural or pericardial effusions. No  enlarged lymph nodes are identified in the chest. Mild emphysematous  changes in both upper lungs.    Abdomen and Pelvis: Multiple images through the pelvis are degraded by  artifact related to a left hip arthroplasty. No bowel obstruction. No  convincing evidence for colitis or diverticulitis. Unremarkable  appendix. No free fluid is identified in the pelvis. There is mild to  moderate prostatic enlargement. Mild atherosclerotic aortoiliac  calcification. Ectasia of the infrarenal abdominal aorta measures up  to 2.6 cm AP x 2.9 cm transverse. The liver, gallbladder, spleen,  adrenal glands, pancreas, and kidneys are unremarkable. No  hydronephrosis. Postoperative changes of  posterior mac and pedicle  screw fusion at L4-L5.        Impression     IMPRESSION:   1. No evidence for pulmonary embolism or thoracic aortic dissection.  2. Mild emphysematous changes in both upper lungs.  3. Mild to moderate prostatic enlargement.                       Clinical Quality Measure: Blood Pressure Screening     Your blood pressure was checked while you were in the emergency department today. The last reading we obtained was  BP: 129/86 (Simultaneous filing. User may not have seen previous data.) . Please read the guidelines below about what these numbers mean and what you should do about them.  If your systolic blood pressure (the top number) is less than 120 and your diastolic blood pressure (the bottom number) is less than 80, then your blood pressure is normal. There is nothing more that you need to do about it.  If your systolic blood pressure (the top number) is 120-139 or your diastolic blood pressure (the bottom number) is 80-89, your blood pressure may be higher than it should be. You should have your blood pressure rechecked within a year by a primary care provider.  If your systolic blood pressure (the top number) is 140 or greater or your diastolic blood pressure (the bottom number) is 90 or greater, you may have high blood pressure. High blood pressure is treatable, but if left untreated over time it can put you at risk for heart attack, stroke, or kidney failure. You should have your blood pressure rechecked by a primary care provider within the next 4 weeks.  If your provider in the emergency department today gave you specific instructions to follow-up with your doctor or provider even sooner than that, you should follow that instruction and not wait for up to 4 weeks for your follow-up visit.        Thank you for choosing [facility]       Thank you for choosing [facility] for your care. Our goal is always to provide you with excellent care. Hearing back from our patients is one way we can  continue to improve our services. Please take a few minutes to complete the written survey that you may receive in the mail after you visit with us. Thank you!        [employer] Information     [employer] gives you secure access to your electronic health record. If you see a primary care provider, you can also send messages to your care team and make appointments. If you have questions, please call your primary care clinic.  If you do not have a primary care provider, please call [phone] and they will assist you.        Care EveryWhere ID     This is your Care EveryWhere ID. This could be used by other organizations to access your [facility] medical records  [id number]        Equal Access to Services     WINSTON HUMPHRIES : Monty Norwood, isabelle brambila, taylor garcia, dionicio russ. So [facility] [phone].    ATENCIÓN: Si habla español, tiene a woodruff disposición servicios gratuitos de asistencia lingüística. Marifer al [phone].    We comply with applicable federal civil rights laws and [place] laws. We do not discriminate on the basis of race, color, national origin, age, disability, sex, sexual orientation, or gender identity.            After Visit Summary       This is your record. Keep this with you and show to your community pharmacist(s) and doctor(s) at your next visit.

## 2018-11-27 LAB — INTERPRETATION ECG - MUSE: NORMAL

## 2018-11-27 NOTE — DISCHARGE INSTRUCTIONS
At this time there is no sign of a blood clot in your lungs.  We know that you have a blood clot in your left leg but needs to be treated with blood thinning medications.  Please continue on the Xarelto.     Please fill your prescription for Xarelto at the pharmacy here at [facility].  They will be able to use the voucher program to get you your first month supply of medication.    If you have any worsening pain in her left leg, if you develop any back pain, trouble with urination, trouble with bowel movements, please return to the ER right away for reevaluation.    Please recheck with your regular doctor within 2-3 days and with a hematologist within 1 week.

## 2018-11-27 NOTE — ED NOTES
Patient placed call light on to express his extreme frustration on length of stay. Physician notified.

## 2019-09-29 ENCOUNTER — HEALTH MAINTENANCE LETTER (OUTPATIENT)
Age: 59
End: 2019-09-29

## 2021-01-14 ENCOUNTER — HEALTH MAINTENANCE LETTER (OUTPATIENT)
Age: 61
End: 2021-01-14

## 2021-10-24 ENCOUNTER — HEALTH MAINTENANCE LETTER (OUTPATIENT)
Age: 61
End: 2021-10-24

## 2022-02-13 ENCOUNTER — HEALTH MAINTENANCE LETTER (OUTPATIENT)
Age: 62
End: 2022-02-13

## 2022-10-15 ENCOUNTER — HEALTH MAINTENANCE LETTER (OUTPATIENT)
Age: 62
End: 2022-10-15

## 2023-03-12 ENCOUNTER — APPOINTMENT (OUTPATIENT)
Dept: GENERAL RADIOLOGY | Facility: CLINIC | Age: 63
End: 2023-03-12
Attending: EMERGENCY MEDICINE

## 2023-03-12 ENCOUNTER — APPOINTMENT (OUTPATIENT)
Dept: ULTRASOUND IMAGING | Facility: CLINIC | Age: 63
End: 2023-03-12
Attending: EMERGENCY MEDICINE

## 2023-03-12 ENCOUNTER — HOSPITAL ENCOUNTER (EMERGENCY)
Facility: CLINIC | Age: 63
Discharge: HOME | End: 2023-03-12
Attending: EMERGENCY MEDICINE | Admitting: EMERGENCY MEDICINE

## 2023-03-12 VITALS
TEMPERATURE: 97.9 F | HEART RATE: 76 BPM | RESPIRATION RATE: 16 BRPM | OXYGEN SATURATION: 97 % | DIASTOLIC BLOOD PRESSURE: 101 MMHG | SYSTOLIC BLOOD PRESSURE: 144 MMHG

## 2023-03-12 DIAGNOSIS — M25.521 RIGHT ELBOW PAIN: Primary | ICD-10-CM

## 2023-03-12 DIAGNOSIS — M79.601 PAIN OF RIGHT UPPER EXTREMITY: ICD-10-CM

## 2023-03-12 LAB
ALBUMIN SERPL BCG-MCNC: 4.2 G/DL (ref 3.5–5.2)
ALP SERPL-CCNC: 104 U/L (ref 40–129)
ALT SERPL W P-5'-P-CCNC: 19 U/L (ref 10–50)
ANION GAP SERPL CALCULATED.3IONS-SCNC: 10 MMOL/L (ref 7–15)
AST SERPL W P-5'-P-CCNC: 30 U/L (ref 10–50)
ATRIAL RATE - MUSE: 78 BPM
BASOPHILS # BLD AUTO: 0.1 10E3/UL (ref 0–0.2)
BASOPHILS NFR BLD AUTO: 1 %
BILIRUB SERPL-MCNC: 0.9 MG/DL
BUN SERPL-MCNC: 14.9 MG/DL (ref 8–23)
CALCIUM SERPL-MCNC: 9.4 MG/DL (ref 8.8–10.2)
CHLORIDE SERPL-SCNC: 98 MMOL/L (ref 98–107)
CREAT SERPL-MCNC: 0.84 MG/DL (ref 0.67–1.17)
CRP SERPL-MCNC: 37.95 MG/L
DEPRECATED HCO3 PLAS-SCNC: 30 MMOL/L (ref 22–29)
DIASTOLIC BLOOD PRESSURE - MUSE: NORMAL MMHG
EOSINOPHIL # BLD AUTO: 0.1 10E3/UL (ref 0–0.7)
EOSINOPHIL NFR BLD AUTO: 2 %
ERYTHROCYTE [DISTWIDTH] IN BLOOD BY AUTOMATED COUNT: 12.5 % (ref 10–15)
ERYTHROCYTE [SEDIMENTATION RATE] IN BLOOD BY WESTERGREN METHOD: 3 MM/HR (ref 0–20)
FLUAV RNA SPEC QL NAA+PROBE: NEGATIVE
FLUBV RNA RESP QL NAA+PROBE: NEGATIVE
GFR SERPL CREATININE-BSD FRML MDRD: >90 ML/MIN/1.73M2
GLUCOSE SERPL-MCNC: 89 MG/DL (ref 70–99)
HCT VFR BLD AUTO: 41.9 % (ref 40–53)
HGB BLD-MCNC: 14.2 G/DL (ref 13.3–17.7)
HOLD SPECIMEN: NORMAL
IMM GRANULOCYTES # BLD: 0 10E3/UL
IMM GRANULOCYTES NFR BLD: 0 %
INTERPRETATION ECG - MUSE: NORMAL
LYMPHOCYTES # BLD AUTO: 2.7 10E3/UL (ref 0.8–5.3)
LYMPHOCYTES NFR BLD AUTO: 35 %
MCH RBC QN AUTO: 30.9 PG (ref 26.5–33)
MCHC RBC AUTO-ENTMCNC: 33.9 G/DL (ref 31.5–36.5)
MCV RBC AUTO: 91 FL (ref 78–100)
MONOCYTES # BLD AUTO: 0.5 10E3/UL (ref 0–1.3)
MONOCYTES NFR BLD AUTO: 7 %
NEUTROPHILS # BLD AUTO: 4.2 10E3/UL (ref 1.6–8.3)
NEUTROPHILS NFR BLD AUTO: 55 %
NRBC # BLD AUTO: 0 10E3/UL
NRBC BLD AUTO-RTO: 0 /100
P AXIS - MUSE: 76 DEGREES
PLATELET # BLD AUTO: 257 10E3/UL (ref 150–450)
POTASSIUM SERPL-SCNC: 3.5 MMOL/L (ref 3.4–5.3)
PR INTERVAL - MUSE: 134 MS
PROT SERPL-MCNC: 7.3 G/DL (ref 6.4–8.3)
QRS DURATION - MUSE: 80 MS
QT - MUSE: 386 MS
QTC - MUSE: 440 MS
R AXIS - MUSE: 77 DEGREES
RBC # BLD AUTO: 4.6 10E6/UL (ref 4.4–5.9)
RSV RNA SPEC NAA+PROBE: NEGATIVE
SARS-COV-2 RNA RESP QL NAA+PROBE: NEGATIVE
SODIUM SERPL-SCNC: 138 MMOL/L (ref 136–145)
SYSTOLIC BLOOD PRESSURE - MUSE: NORMAL MMHG
T AXIS - MUSE: 67 DEGREES
TROPONIN T SERPL HS-MCNC: <6 NG/L
VENTRICULAR RATE- MUSE: 78 BPM
WBC # BLD AUTO: 7.7 10E3/UL (ref 4–11)

## 2023-03-12 PROCEDURE — 71046 X-RAY EXAM CHEST 2 VIEWS: CPT

## 2023-03-12 PROCEDURE — 85652 RBC SED RATE AUTOMATED: CPT | Performed by: EMERGENCY MEDICINE

## 2023-03-12 PROCEDURE — 96374 THER/PROPH/DIAG INJ IV PUSH: CPT

## 2023-03-12 PROCEDURE — 250N000011 HC RX IP 250 OP 636: Performed by: EMERGENCY MEDICINE

## 2023-03-12 PROCEDURE — 93005 ELECTROCARDIOGRAM TRACING: CPT

## 2023-03-12 PROCEDURE — 99285 EMERGENCY DEPT VISIT HI MDM: CPT | Mod: CS,25

## 2023-03-12 PROCEDURE — 87637 SARSCOV2&INF A&B&RSV AMP PRB: CPT | Performed by: EMERGENCY MEDICINE

## 2023-03-12 PROCEDURE — 73080 X-RAY EXAM OF ELBOW: CPT | Mod: RT

## 2023-03-12 PROCEDURE — 80053 COMPREHEN METABOLIC PANEL: CPT | Performed by: EMERGENCY MEDICINE

## 2023-03-12 PROCEDURE — 96375 TX/PRO/DX INJ NEW DRUG ADDON: CPT

## 2023-03-12 PROCEDURE — 85025 COMPLETE CBC W/AUTO DIFF WBC: CPT | Performed by: EMERGENCY MEDICINE

## 2023-03-12 PROCEDURE — 84484 ASSAY OF TROPONIN QUANT: CPT | Performed by: EMERGENCY MEDICINE

## 2023-03-12 PROCEDURE — 86140 C-REACTIVE PROTEIN: CPT | Performed by: EMERGENCY MEDICINE

## 2023-03-12 PROCEDURE — 93971 EXTREMITY STUDY: CPT | Mod: RT

## 2023-03-12 PROCEDURE — 36415 COLL VENOUS BLD VENIPUNCTURE: CPT | Performed by: EMERGENCY MEDICINE

## 2023-03-12 PROCEDURE — C9803 HOPD COVID-19 SPEC COLLECT: HCPCS

## 2023-03-12 RX ORDER — ONDANSETRON 4 MG/1
4 TABLET, ORALLY DISINTEGRATING ORAL EVERY 8 HOURS PRN
Qty: 10 TABLET | Refills: 0 | Status: SHIPPED | OUTPATIENT
Start: 2023-03-12

## 2023-03-12 RX ORDER — IBUPROFEN 600 MG/1
600 TABLET, FILM COATED ORAL EVERY 6 HOURS PRN
Qty: 60 TABLET | Refills: 0 | Status: SHIPPED | OUTPATIENT
Start: 2023-03-12

## 2023-03-12 RX ORDER — HYDROMORPHONE HYDROCHLORIDE 1 MG/ML
0.5 INJECTION, SOLUTION INTRAMUSCULAR; INTRAVENOUS; SUBCUTANEOUS
Status: DISCONTINUED | OUTPATIENT
Start: 2023-03-12 | End: 2023-03-12 | Stop reason: HOSPADM

## 2023-03-12 RX ORDER — KETOROLAC TROMETHAMINE 15 MG/ML
15 INJECTION, SOLUTION INTRAMUSCULAR; INTRAVENOUS ONCE
Status: COMPLETED | OUTPATIENT
Start: 2023-03-12 | End: 2023-03-12

## 2023-03-12 RX ORDER — OXYCODONE AND ACETAMINOPHEN 5; 325 MG/1; MG/1
1 TABLET ORAL EVERY 6 HOURS PRN
Qty: 12 TABLET | Refills: 0 | Status: SHIPPED | OUTPATIENT
Start: 2023-03-12 | End: 2023-03-15

## 2023-03-12 RX ADMIN — KETOROLAC TROMETHAMINE 15 MG: 15 INJECTION, SOLUTION INTRAMUSCULAR; INTRAVENOUS at 18:06

## 2023-03-12 RX ADMIN — HYDROMORPHONE HYDROCHLORIDE 0.5 MG: 1 INJECTION, SOLUTION INTRAMUSCULAR; INTRAVENOUS; SUBCUTANEOUS at 18:06

## 2023-03-12 ASSESSMENT — ENCOUNTER SYMPTOMS
SORE THROAT: 0
ARTHRALGIAS: 1
COUGH: 1
SHORTNESS OF BREATH: 1
NUMBNESS: 1

## 2023-03-12 ASSESSMENT — ACTIVITIES OF DAILY LIVING (ADL): ADLS_ACUITY_SCORE: 35

## 2023-03-12 NOTE — ED TRIAGE NOTES
Pt report arm numbness and pain in right arm that started 1 week ago pt has hx of blood clots in arms and legs pt is NOT on blood thinners

## 2023-03-12 NOTE — ED PROVIDER NOTES
History   Chief Complaint:  Arm Pain and Breathing Problem     HPI   Abdirahman Reno is a 62 year old male with a history of blood clots who presents with arm pain. Patient says that he has had some congestion and productive cough for the past four days, and then two days ago noticed his right elbow was tender. He says that the pain progressed to the point where he was waking up every hour last night with immense pain. He says that he cannot move the elbow. He denies chest pain. He denies sore throat. He endorses some mild shortness of breath. He says that he has numbness and tingling going into his hand. Patient was on eliquis after a past blood clot and was taken off after about 6-9 months. He says that he has had two clots in his legs and one in his arm all on separate occasions. He denies recent travel, surgeries, or hormone use. Denies recent falls or injuries.     Independent Historian:   None - Patient Only      ROS:  Review of Systems   HENT: Positive for congestion. Negative for sore throat.    Respiratory: Positive for cough and shortness of breath.    Cardiovascular: Negative for chest pain.   Musculoskeletal: Positive for arthralgias.   Neurological: Positive for numbness.   All other systems reviewed and are negative.    Allergies:  No Known Allergies     Medications:    Eliquis  Citalopram  Tramadol  omeprazole  Atorvastatin  Methylprednisolone  Albuterol    Past Medical History:    GERD  Depressive disorder  DVT  PE bilateral  Colon polyp  Inflammatory polyarthropathy  Erectile disorder    Past Surgical History:    Back surgery  Hip surgery x 3  Carpal tunnel release     Family History:    Prostate cancer    Social History:  Reports that he has quit smoking. He does not have any smokeless tobacco history on file. He reports current alcohol use. He reports that he does not use drugs.  PCP: [facility]     Physical Exam     Patient Vitals for the past 24 hrs:   BP Temp Pulse Resp SpO2    03/12/23 1958 -- -- -- -- 97 %   03/12/23 1957 (!) 144/101 -- 76 16 --   03/12/23 1808 (!) 163/106 -- 77 -- 98 %   03/12/23 1800 -- -- -- -- 96 %   03/12/23 1632 (!) 162/92 97.9  F (36.6  C) 74 16 95 %      Physical Exam  General: Alert, appears well-developed and well-nourished. Cooperative.     In mild distress  HEENT:  Head:  Atraumatic  Ears:  External ears are normal  Mouth/Throat:  Oropharynx is without erythema or exudate and mucous membranes are moist.   Eyes:   Conjunctivae normal and EOM are normal. No scleral icterus.  CV:  Normal rate, regular rhythm, normal heart sounds and radial pulses are 2+ and symmetric.  No murmur.  Resp:  Breath sounds are clear bilaterally    Non-labored, no retractions or accessory muscle use  GI:  Abdomen is soft, no distension, no tenderness. No rebound or guarding.  No CVA tenderness bilaterally  MS:  Normal range of motion. No edema.    Back atraumatic.    No midline cervical, thoracic, or lumbar tenderness    Right Elbow:    The humerus is non-tender    The olecranon is non-tender    The lateral supracondylar region is exquisitely tender to palpation    There is no obvious clinical effusion    There is pain with Pronation and Supination    There is pain with Flexion and Extension    The radial head and neck are non-tender    The proximal ulnar is non-tender and there is no step off    Distal Hand Exam:    The finger flexors (FDS/FDP) are intact    The finger extensors are intact    The thumb exam is normal, including:    Adduction, abduction, flexion, extension, opposition    There are no sensory deficits    Median, Ulnar, and Radial nerve function is normal    Radial artery pulsations are normal    Capillary refill is normal  Skin:  Warm and dry.  No rash or lesions noted.  Neuro: Alert. Normal strength.  Sensation intact in all 4 extremities. GCS: 15  Psych:  Normal mood and affect.    Emergency Department Course     Imaging:  Elbow XR, G/E 3 views, right   Final  Result   IMPRESSION: Normal joint spaces and alignment. No fracture or joint effusion.      XR Chest 2 Views   Final Result   IMPRESSION: Heart is normal in size. Lungs are hyperinflated suggesting COPD. Lungs are clear.      US Upper Extremity Venous Duplex Right   Final Result   IMPRESSION:   1.  No deep venous thrombosis in the right upper extremity.      Report per radiology    Laboratory:  Labs Ordered and Resulted from Time of ED Arrival to Time of ED Departure   COMPREHENSIVE METABOLIC PANEL - Abnormal       Result Value    Sodium 138      Potassium 3.5      Chloride 98      Carbon Dioxide (CO2) 30 (*)     Anion Gap 10      Urea Nitrogen 14.9      Creatinine 0.84      Calcium 9.4      Glucose 89      Alkaline Phosphatase 104      AST 30      ALT 19      Protein Total 7.3      Albumin 4.2      Bilirubin Total 0.9      GFR Estimate >90     CRP INFLAMMATION - Abnormal    CRP Inflammation 37.95 (*)    TROPONIN T, HIGH SENSITIVITY - Normal    Troponin T, High Sensitivity <6     INFLUENZA A/B, RSV, & SARS-COV2 PCR - Normal    Influenza A PCR Negative      Influenza B PCR Negative      RSV PCR Negative      SARS CoV2 PCR Negative     ERYTHROCYTE SEDIMENTATION RATE AUTO - Normal    Erythrocyte Sedimentation Rate 3     CBC WITH PLATELETS AND DIFFERENTIAL    WBC Count 7.7      RBC Count 4.60      Hemoglobin 14.2      Hematocrit 41.9      MCV 91      MCH 30.9      MCHC 33.9      RDW 12.5      Platelet Count 257      % Neutrophils 55      % Lymphocytes 35      % Monocytes 7      % Eosinophils 2      % Basophils 1      % Immature Granulocytes 0      NRBCs per 100 WBC 0      Absolute Neutrophils 4.2      Absolute Lymphocytes 2.7      Absolute Monocytes 0.5      Absolute Eosinophils 0.1      Absolute Basophils 0.1      Absolute Immature Granulocytes 0.0      Absolute NRBCs 0.0        Emergency Department Course & Assessments:    Interventions:  Medications   HYDROmorphone (PF) (DILAUDID) injection 0.5 mg (0.5 mg Intravenous  $Given 3/12/23 1806)   ketorolac (TORADOL) injection 15 mg (15 mg Intravenous $Given 3/12/23 1806)      Assessments:  1734 I examined the patient and obtained history as noted above.     Independent Interpretation (X-rays, CTs, rhythm strip):  I independently reviewed the patient's Xray and did not see any acute fracture or dislocation.     Consultations/Discussion of Management or Tests:  None        Social Determinants of Health affecting care:   None    Disposition:  The patient was discharged to home.     Impression & Plan      Medical Decision Making:  Patient is a 62-year-old male with a history of DVT who presents with right upper extremity arm pain starting about 1 week ago much more severe pain in the elbow in the last 2 days and distal numbness to the right forearm.  He is right-hand dominant.  Work-up included ultrasound of the right upper extremity to evaluate for potential blood clots.  Thankfully no DVT noticed within the right upper extremity.  There is a limited evaluation of the elbow but no fluid collections were demonstrated.  He does have very pinpoint tenderness to the space between the radial head and the lateral epicondyles of the distal humerus.  On my evaluation with a bedside ultrasound I do not see a significant effusion present and so am hesitant to perform an arthrocentesis given minimal fluid present within the joint.  There is a nonspecific CRP elevation but ESR is normal.  No leukocytosis.  EKG showed no concerning ischemic changes and troponin undetectable low concern for ACS.  Patient has significant pain with range of motion such as flexion or extension at the right elbow.  He also has some pain with pronation and supination.  There is no tenderness at the olecranon bursa.  COVID is negative.  He did have some ongoing viral symptoms over the last several days with a mild cough.  Chest x-ray shows no sign of infection.  Elbow x-ray shows no sign of fracture nor joint effusion.  In  discussion with the patient, I did offer the patient an emergent MRI of the elbow to further evaluate the elbow joint for further sinister pathology.  I think this would better define whether an atraumatic effusion is present and whether there is a significant enough effusion to attempt arthrocentesis.  Additionally, this could look for potential occult fracture or other ligamentous injury within the joint to explain the patient's ongoing discomfort.  He is unwilling to wait for the few hours it may take to obtain an emergent MRI in the emergency department tonight.  I did offer that we could try pain control, a shoulder sling and close follow-up with orthopedic surgery in the next 1 to 3 days for reassessment.  He understands that he is welcome to return to the emergency department at any time for repeat assessment.  My differential certainly includes inflammatory arthritides versus septic arthritis vs ligamentous injury vs tendonitis.  Without further imaging and arthrocentesis, challenging to interpret the etiology of his atraumatic right elbow discomfort.  Close follow-up encouraged with orthopedic surgery.  Return if he develops fever, worsening swelling or pain to the elbow or any new concerning symptom onset.  After all questions answered, discharged home.    Diagnosis:    ICD-10-CM    1. Right elbow pain  M25.521 Neck/Shoulder/Back Order Sling; Right      2. Pain of right upper extremity  M79.601          Discharge Medications:  Discharge Medication List as of 3/12/2023  8:04 PM      START taking these medications    Details   ibuprofen (ADVIL/MOTRIN) 600 MG tablet Take 1 tablet (600 mg) by mouth every 6 hours as needed for moderate pain (4-6), Disp-60 tablet, R-0, E-Prescribe      ondansetron (ZOFRAN ODT) 4 MG ODT tab Take 1 tablet (4 mg) by mouth every 8 hours as needed for nausea, Disp-10 tablet, R-0, E-Prescribe      oxyCODONE-acetaminophen (PERCOCET) 5-325 MG tablet Take 1 tablet by mouth every 6 hours  as needed for pain, Disp-12 tablet, R-0, E-Prescribe            Scribe Disclosure:  I, Georges Nicholas, am serving as a scribe at 5:24 PM on 3/12/2023 to document services personally performed by Yury Alberto MD based on my observations and the provider's statements to me.     3/12/2023   Yury Alberto MD White, Scott, MD  03/12/23 2156

## 2023-03-13 NOTE — DISCHARGE INSTRUCTIONS

## 2023-10-29 ENCOUNTER — HEALTH MAINTENANCE LETTER (OUTPATIENT)
Age: 63
End: 2023-10-29

## 2024-12-21 ENCOUNTER — HEALTH MAINTENANCE LETTER (OUTPATIENT)
Age: 64
End: 2024-12-21